# Patient Record
Sex: FEMALE | Race: WHITE | URBAN - METROPOLITAN AREA
[De-identification: names, ages, dates, MRNs, and addresses within clinical notes are randomized per-mention and may not be internally consistent; named-entity substitution may affect disease eponyms.]

---

## 2020-08-20 ENCOUNTER — OFFICE VISIT (OUTPATIENT)
Dept: ORTHOPEDIC SURGERY | Age: 50
End: 2020-08-20
Payer: COMMERCIAL

## 2020-08-20 VITALS — TEMPERATURE: 97.8 F | WEIGHT: 175.93 LBS | HEIGHT: 66 IN | BODY MASS INDEX: 28.27 KG/M2

## 2020-08-20 PROCEDURE — 99203 OFFICE O/P NEW LOW 30 MIN: CPT | Performed by: INTERNAL MEDICINE

## 2020-08-20 PROCEDURE — 96372 THER/PROPH/DIAG INJ SC/IM: CPT | Performed by: INTERNAL MEDICINE

## 2020-08-20 PROCEDURE — E0114 CRUTCH UNDERARM PAIR NO WOOD: HCPCS | Performed by: INTERNAL MEDICINE

## 2020-08-20 RX ORDER — PREDNISONE 50 MG/1
50 TABLET ORAL DAILY
Qty: 6 TABLET | Refills: 0 | Status: SHIPPED | OUTPATIENT
Start: 2020-08-20 | End: 2020-08-26

## 2020-08-20 RX ORDER — TRAMADOL HYDROCHLORIDE 50 MG/1
50 TABLET ORAL EVERY 6 HOURS PRN
Qty: 20 TABLET | Refills: 0 | Status: SHIPPED | OUTPATIENT
Start: 2020-08-20 | End: 2020-08-25

## 2020-08-20 RX ORDER — KETOROLAC TROMETHAMINE 30 MG/ML
60 INJECTION, SOLUTION INTRAMUSCULAR; INTRAVENOUS ONCE
Status: COMPLETED | OUTPATIENT
Start: 2020-08-20 | End: 2020-08-20

## 2020-08-20 RX ORDER — CYCLOBENZAPRINE HCL 10 MG
10 TABLET ORAL NIGHTLY PRN
Qty: 30 TABLET | Refills: 1 | Status: SHIPPED | OUTPATIENT
Start: 2020-08-20

## 2020-08-20 RX ADMIN — KETOROLAC TROMETHAMINE 60 MG: 30 INJECTION, SOLUTION INTRAMUSCULAR; INTRAVENOUS at 17:25

## 2020-08-20 NOTE — LETTER
[address] [address]  Phone: [phone]  Fax: [phone]    Sharmila Miller MD        August 20, 2020     Patient: Linda Mcmahon   YOB: 1970   Date of Visit: 8/20/2020       To Whom It May Concern: It is my medical opinion that [facility] Child should remain out of work until August 27th 2020. If you have any questions or concerns, please don't hesitate to call.     Sincerely,      Baldemar Fang MD.    Sharmila Miller MD

## 2020-08-20 NOTE — PROGRESS NOTES
Chief Complaint:   Chief Complaint   Patient presents with    Lower Back Pain     pain after mulching 1 month ago, then worsened past week, L sided radic numbness, weakness, tremoring          History of Present Illness:       Patient is a 52 y.o. female presents with the above complaint. The symptoms began worsening On Sunday and started without an injury. The patient describes a sharp, aching, burning pain that does radiate. The symptoms are constant  and are are worsening since the onset. The symptoms of back pain doshow a discogenic provacative pattern and are worsened by sitting and improved with standing however today the symptoms have become more pervasive whether sitting or standing. There is not new onset weakness or progressive weakness of the lower extremities that has developed. The patient denies new onset bowel or bladder dysfunction. There  is no history of previous spinal trauma. Pain localizes to the lumbar region-left lumbosacral    Painl levels: 8-10/10    There is lower limb pain . The back pain : limb pain is 20 : 80 and follows a L5-S1 dermatomal distribution involving Left lower extremity. The patient has no history or autoimmune disease, inflammatory arthropathy or crystal arthropathy. Past Medical History:      No past medical history on file. Past Surgical History:   Procedure Laterality Date    HAND SURGERY Right     ring finger, plate inserted    KNEE SURGERY Right     ACL          Present Medications:         Current Outpatient Medications   Medication Sig Dispense Refill    Drospirenone-Ethinyl Estradiol (VESTURA PO) Take by mouth      predniSONE (DELTASONE) 10 MG tablet Take 5 tabs PO days 1-2, take 4 tabs days 3-4, take 3 tabs days 5-6, take 2 tabs days 7-8, take 1 tab 9-10 30 tablet 0     No current facility-administered medications for this visit.           Allergies:      No Known Allergies     Social History:         Social History     Socioeconomic History    Marital status:      Spouse name: Not on file    Number of children: Not on file    Years of education: Not on file    Highest education level: Not on file   Occupational History    Not on file   Social Needs    Financial resource strain: Not on file    Food insecurity     Worry: Not on file     Inability: Not on file    Transportation needs     Medical: Not on file     Non-medical: Not on file   Tobacco Use    Smoking status: Never Smoker   Substance and Sexual Activity    Alcohol use: Not on file    Drug use: Not on file    Sexual activity: Not on file   Lifestyle    Physical activity     Days per week: Not on file     Minutes per session: Not on file    Stress: Not on file   Relationships    Social connections     Talks on phone: Not on file     Gets together: Not on file     Attends Orthodoxy service: Not on file     Active member of club or organization: Not on file     Attends meetings of clubs or organizations: Not on file     Relationship status: Not on file    Intimate partner violence     Fear of current or ex partner: Not on file     Emotionally abused: Not on file     Physically abused: Not on file     Forced sexual activity: Not on file   Other Topics Concern    Not on file   Social History Narrative    Not on file        Review of Symptoms:    Pertinent items are noted in HPI    Review of systems reviewed from Patient History Form dated on today's date and   available in the patient's chart under the Media tab. Vital Signs:      Vitals:    08/20/20 1552   Temp: 97.8 °F (36.6 °C)        General Exam:     Constitutional: Patient is adequately groomed with no evidence of malnutrition  Mental Status: The patient is oriented to time, place and person. The patient's mood and affect are appropriate. Vascular: Examination reveals no swelling or calf tenderness. Peripheral pulses are palpable and 2+.     Lymphatics: no lymphadenopathy of the inguinal region or lower extremity      Physical Exam: lower back      Primary Exam:    Inspection: No deformity atrophy appreciable curvature      Palpation: No focal trigger point tenderness      Range of Motion: 60/5 pain with flexion and extension      Strength: Normal lower extremity      Special Tests: SLR markedly positive left      Skin: There are no rashes, ulcerations or lesions. Gait: Antalgic      Reflex intact lower     Additional Comments:        Additional Examinations:           Right Lower Extremity: Examination of the right lower extremity does not show any tenderness, deformity or injury. Range of motion is unremarkable. There is no gross instability. There are no rashes, ulcerations or lesions. Strength and tone are normal.  Left Lower Extremity: Examination of the left lower extremity does not show any tenderness, deformity or injury. Range of motion is unremarkable. There is no gross instability. There are no rashes, ulcerations or lesions. Strength and tone are normal.         Office Imaging Results/Procedures PerformedToday:      Radiology:      X-rays obtained and reviewed in office:   Views 3 views   Location lumbar spine   Impression normal alignment on the AP projection lateral projection reveals minimal spondylosis at L2/L3 no focal advanced intervertebral disc space narrowing vertebral body heights are well-maintained       Office Procedures:     Orders Placed This Encounter   Procedures    XR LUMBAR SPINE (2-3 VIEWS)     Standing Status:   Future     Number of Occurrences:   1     Standing Expiration Date:   8/20/2021     Order Specific Question:   Reason for exam:     Answer:   pain     Intramuscular injection Toradol 2 cc left buttock alcohol prep 25-gauge needle      Other Outside Imaging and Testing Personally Reviewed:    No results found. Assessment   Impression: . Encounter Diagnoses   Name Primary?     Lumbar discogenic pain syndrome     Lumbosacral radiculopathy at S1     Lumbar pain               Plan:     IM injection of Toradol followed by high-dose steroids-prednisone  Crutch assisted weightbearing for symptom control and prevent fall  Medical pain management tramadol and Flexeril short-term for symptom control  MRI evaluation lumbar spine evaluate severity of compressive discopathy suspected clinically  Reviewed neurologic emergencies and to contact 911  Clinical follow-up ASAP after MRI evaluation off work x1 week      The nature of the finding, probable diagnosis and likely treatment was thoroughly discussed with the patient. The options, risks, complications, alternative treatment as well as some of the differential diagnosis was discussed. The patient was thoroughly informed and all questions were answered. the patient indicated understanding and satisfaction with the discussion. Orders:        Orders Placed This Encounter   Procedures    XR LUMBAR SPINE (2-3 VIEWS)     Standing Status:   Future     Number of Occurrences:   1     Standing Expiration Date:   8/20/2021     Order Specific Question:   Reason for exam:     Answer:   pain           Disclaimer: \"This note was dictated with voice recognition software. Though review and correction are routine, we apologize for any errors. \"

## 2020-08-25 ENCOUNTER — OFFICE VISIT (OUTPATIENT)
Dept: ORTHOPEDIC SURGERY | Age: 50
End: 2020-08-25
Payer: COMMERCIAL

## 2020-08-25 VITALS — WEIGHT: 175.93 LBS | BODY MASS INDEX: 28.27 KG/M2 | TEMPERATURE: 97.1 F | HEIGHT: 66 IN

## 2020-08-25 PROCEDURE — 99213 OFFICE O/P EST LOW 20 MIN: CPT | Performed by: INTERNAL MEDICINE

## 2020-08-25 RX ORDER — MELOXICAM 15 MG/1
15 TABLET ORAL DAILY
Qty: 30 TABLET | Refills: 1 | Status: SHIPPED | OUTPATIENT
Start: 2020-08-25 | End: 2020-08-25

## 2020-08-25 RX ORDER — GABAPENTIN 300 MG/1
300 CAPSULE ORAL NIGHTLY
Qty: 30 CAPSULE | Refills: 1 | Status: SHIPPED | OUTPATIENT
Start: 2020-08-25 | End: 2020-09-15 | Stop reason: ALTCHOICE

## 2020-08-25 NOTE — PROGRESS NOTES
Chief Complaint:   Chief Complaint   Patient presents with    Leg Pain     left leg, mostly N/T post thigh, calf, foot; meds helpful, pain 3/10 today          History of Present Illness:       Patient is a 52 y.o. female returns follow up for the above complaint. The patient was last seen approximately 5 daysago. The symptoms are improving since the last visit. The patient has had no further testing for the problem. She estimates 50 to 60% improvement overall increasing more functional.  She continues to have lower limb pain described as numbness and pins-and-needles involving the foot which is constant    Back pain to leg pain ratio essentially 0: 100    She denies any new onset progressive weakness of the lower extremities    No new injuries no new events tolerant of multimodal medical pain management and high-dose steroids     Past Medical History:      No past medical history on file. Present Medications:         Current Outpatient Medications   Medication Sig Dispense Refill    predniSONE (DELTASONE) 50 MG tablet Take 1 tablet by mouth daily for 6 days 6 tablet 0    traMADol (ULTRAM) 50 MG tablet Take 1 tablet by mouth every 6 hours as needed for Pain for up to 5 days. 20 tablet 0    cyclobenzaprine (FLEXERIL) 10 MG tablet Take 1 tablet by mouth nightly as needed for Muscle spasms 30 tablet 1    Drospirenone-Ethinyl Estradiol (VESTURA PO) Take by mouth       No current facility-administered medications for this visit.           Allergies:      No Known Allergies        Review of Systems:    Pertinent items are noted in HPI          Vital Signs:      Vitals:    08/25/20 1020   Temp: 97.1 °F (36.2 °C)        General Exam:     Constitutional: Patient is adequately groomed with no evidence of malnutrition    Physical Exam: lower back      Primary Exam:    Inspection: No deformity atrophy or curvature      Palpation: No focal tenderness      Range of Motion: 100/20 without pain      Strength: Normal lower extremity      Special Tests: Negative SLR      Skin: There are no rashes, ulcerations or lesions. Gait: Nonantalgic    Neurovascular - non focal and intact       Additional Comments:        Additional Examinations:                   Office Imaging Results/Procedures PerformedToday:            Office Procedures:   No orders of the defined types were placed in this encounter. Other Outside Imaging and Testing Personally Reviewed:    Xr Lumbar Spine (2-3 Views)    Result Date: 2020  Radiology exam is complete. No Radiologist dictation. Please follow up with ordering provider. Mri Lumbar Spine Wo Contrast    Result Date: 2020  Site: [facility] #: [id number] #: [id number] Procedure: MR Lumbar Spine w/o Contrast ; Reason for Exam: Lumbar discogenic pain syndrome; lumbosacral radiculopathy at S1; lumbar pain This document is confidential medical information. Unauthorized disclosure or use of this information is prohibited by law. If you are not the intended recipient of this document, please advise us by calling immediately [phone]. [address] [facility], [facility] Patient Name: Angelique Hoffmann Case ID: [id number] Patient : 1970 Referring Physician: Javier Otero MD Exam Date: 2020 Exam Description: MR Lumbar Spine w/o Contrast HISTORY:  Low back pain into left lower extremity. TECHNICAL FACTORS:  Long- and short-axis fat- and water-weighted images were performed. COMPARISON:  None. FINDINGS:  No scoliosis. No lumbar fracture. Minor disc desiccation with mild-to-moderate sterile discogenic endplate changes at T5-N9. The conus is normal terminating at L1. Findings at individual levels demonstrate: T11-12, T12-L1, L1-2 unremarkable. L2-3 minor facet hypertrophy. No compressive abnormality. L3-4 mild diffuse spondylotic disc displacement with right lateral annular tear and shallow disc protrusion barely abutting exiting right L3 nerve root.  L4-5 mild

## 2020-09-02 ENCOUNTER — HOSPITAL ENCOUNTER (OUTPATIENT)
Dept: PHYSICAL THERAPY | Age: 50
Setting detail: THERAPIES SERIES
Discharge: HOME | End: 2020-09-02
Payer: COMMERCIAL

## 2020-09-02 PROCEDURE — 97161 PT EVAL LOW COMPLEX 20 MIN: CPT | Performed by: PHYSICAL THERAPIST

## 2020-09-02 PROCEDURE — 97140 MANUAL THERAPY 1/> REGIONS: CPT | Performed by: PHYSICAL THERAPIST

## 2020-09-02 PROCEDURE — 97110 THERAPEUTIC EXERCISES: CPT | Performed by: PHYSICAL THERAPIST

## 2020-09-02 NOTE — FLOWSHEET NOTE
[address] [facility]    [facility] Treatment Note/ Progress Report:   Date:  2020    Patient Name:  Chela Solano    :  1970  MRN: [id number]  Restrictions/Precautions:    Medical/Treatment Diagnosis Information:  · Diagnosis: Lumbar HNP, radiculopathy  M51.27  · Treatment Diagnosis: PT treatment diagnosis:  low back pain  P44.2  Insurance/Certification information:  PT Insurance Information: [employer]   $40 copay  Physician Information:  Referring Practitioner: Dr Hector Hill of care signed (Y/N):     Date of Patient follow up with Physician:     Is this a Progress Report:     []  Yes  [x]  No        If Yes:  Date Range for reporting period:  Beginning  Ending    Progress report will be due (10 Rx or 30 days whichever is less):        Recertification will be due (POC Duration  / 90 days whichever is less): 10/2/20     Date of Surgery:        Visit # Insurance Allowable Auth Required   1 60 []  Yes []  No        Functional Scale:     Date assessed:        Latex Allergy:  [x]NO      []YES  Preferred Language for Healthcare:   [x]English       []other    Pain level:  0-2/10     SUBJECTIVE:  See eval    Type: []Constant   []Intermitment  []Radiating []Localized  []other:     Functional Limitations: []Sitting []Standing []Walking    []Squatting []Stairs                []ADL's  []Driving []Sports/Recreation   []Lifting/reaching     []Grooming    []Carrying []Driving  []Sports/Recreations   []Other:      OBJECTIVE:     ROM Current (R) Current (L)                       Strength                           Gait:     Joint mobility:    []Normal    []Hypo   []Hyper    Palpation:     Orthopedic Tests:     RESTRICTIONS/PRECAUTIONS: Extension bias    Exercises/Interventions:         Stretching Repetitions/Resistance Nots/Last Progression   [facility]     Prone prop 4'    Prone press up 10x5''    Piriformis Cross Over     Figure 4 Piriformis Supine ITB     Prone Quad Stretch     Prayer Stretch     Hand Heel Rock     Cat/Cow     LTR                    Exercises     Standing trunk extension 10x5''    Prone glut sets 10x5''    Bridge  2x10    SLR Flex     SLR Abd 2x10  L only   SLR Ext     Clamshells     TA / Multifidus / Abd Hollow     TA March     Prone Plank     Side Plank     Quadriped UE/ LE/ Birddog     Squats     [employer] Kick                 Manual      Hamstring Stretch     SKC     Piriformis Stretch      ITB Stretch      Prone Quad Stretch      Traction     Sacral Decompression 2'    Lumbar PA Grade-  6'    Supine Lumbar Roll     SL Lumbar      Long Axis Hip Distraction Grade       Therapeutic Exercise and NMR EXR  [] (12315) Provided verbal/tactile cueing for activities related to strengthening, flexibility, endurance, ROM  for improvements in proximal hip and core control with self care, mobility, lifting and ambulation.  [] (88319) Provided verbal/tactile cueing for activities related to improving balance, coordination, kinesthetic sense, posture, motor skill, proprioception  to assist with core control in self care, mobility, lifting, and ambulation.      Therapeutic Activities:    [] (34067 or 42256) Provided verbal/tactile cueing for activities related to improving balance, coordination, kinesthetic sense, posture, motor skill, proprioception and motor activation to allow for proper function  with self care and ADLs  [] (41262) Provided training and instruction to the patient for proper core and proximal hip recruitment and positioning with ambulation re-education     Home Exercise Program:    [x] (90701) Reviewed/Progressed HEP activities related to strengthening, flexibility, endurance, ROM of core, proximal hip and LE for functional self-care, mobility, lifting and ambulation   [] (73695) Reviewed/Progressed HEP activities related to improving balance, coordination, kinesthetic sense, posture, motor skill, proprioception of Progressing: [] Met: [] Not Met: [] Adjusted  4. Patient will return to sitting or standing for 1 hour, functional activities without increased symptoms or restriction. [] Progressing: [] Met: [] Not Met: [] Adjusted     Overall Progression Towards Functional goals/ Treatment Progress Update:  [] Patient is progressing as expected towards functional goals listed. [] Progression is slowed due to complexities/Impairments listed. [] Progression has been slowed due to co-morbidities. [x] Plan just implemented, too soon to assess goals progression <30days   [] Goals require adjustment due to lack of progress  [] Patient is not progressing as expected and requires additional follow up with physician  [] Other    ASSESSMENT:  See eval    Treatment/Activity Tolerance:  [x] Patient tolerated treatment well [] Patient limited by fatique  [] Patient limited by pain  [] Patient limited by other medical complications  [] Other:     Prognosis: [x] Good [] Fair  [] Poor    Patient Requires Follow-up: [x] Yes  [] No    PLAN: See eval  [] Continue per plan of care [] Alter current plan (see comments)  [x] Plan of care initiated [] Hold pending MD visit [] Discharge        Electronically signed by: Tosha Duarte PT, OMT-C      Note: If patient does not return for scheduled/ recommended follow up visits, this note will serve as a discharge from care along with most recent update on progress.

## 2020-09-02 NOTE — PLAN OF CARE
The [facility] and [facility]  [address], [place], [address] [facility]  Phone: [phone]   Fax:     [phone]                                                       Harley Lind    Dear  Referring Practitioner: Dr Faisal Genao,    We had the pleasure of evaluating the following patient for physical therapy services at [address] [place] [address]. A summary of our findings can be found in the initial assessment below. This includes our plan of care. If you have any questions or concerns regarding these findings, please do not hesitate to contact me at the office phone number checked above. Thank you for the referral.       Physician Signature:_______________________________Date:__________________  By signing above (or electronic signature), therapists plan is approved by physician      Patient: Lori Allen   : 1970   MRN: [id number]  Referring Physician: Referring Practitioner: Dr Faisal Genao      Evaluation Date: 2020      Medical Diagnosis Information:  Diagnosis: Lumbar HNP, radiculopathy  M51.27   Treatment Diagnosis: PT treatment diagnosis:  low back pain  M54.5                                         Insurance information: PT Insurance Information: [employer]   $40 copay     Precautions/ Contra-indications:   Latex Allergy:  [x]NO      []YES  Preferred Language for Healthcare:   [x]English       []other:    SUBJECTIVE: Patient stated complaint:  Low back pain that radiates into the LLE. Symptoms began 3 weeks ago after doing some yard work. Began having difficulty putting weight on left leg. MRI:  (+) L5-S1 HNP. Currently having pain into the left buttock/thigh. Hamstring and calf feel like they are always mildly cramping. Left heel/toes constantly tingle. MD follow up on 20.      Relevant Medical History: R ACL reconstruction  Functional Disability Index:Mod [facility]-24%      Pain Scale: 0-2/10    Easing factors: Medication    Provocative factors: prolonged standing, sitting     Night Pain: denies     Type: []Constant   [x]Intermittent  []Radiating []Localized []other:     Numbness/Tingling: Left heel and toes     Red Flag Symptoms: Denied symptoms associated with more severe pathology    to include loss of bowel and bladder control, fever, chills, nausea, headache, recent weight gain, recent weight loss, night sweats, decreased appetite, fatigue. Functional Limitations/Impairments: []Sitting []Standing []Walking    [x]Squatting/bending  []Stairs           []ADL's  []Transfers []Sports/Recreation []Other:    Occupation/School: teach gymnastics    Sport/recreational activities:      Living Status/Prior Level of Function: This patient was independent in ADL's and IADL's prior to onset of symptoms.        OBJECTIVE:       Standing Exam Normal Abnormal N/A Comments   Toe walk    x  Unable on L   Heel Walk x      Pelvic Height x      Fwd Bend- (aberrant juttering or innominate mvmt)- Standing Flexion Test x      Extension x      Sacral Sulcus Test (Side Flexion) x      Trendelenburg x      Combined Movements                                   Seated Exam Normal Abnormal N/A Comments   Pelvic Height x      Seated Rotation x      Seated flexion x      B hip IR x      SLUMP Test x             Supine Exam Normal Abnormal N/A Comments   Hip flexion x      Abduction x      ER x      IR x      BRITTANY/Jim x      FADIR x      SLR x      Crossed SLR       Supine to sit       Supine to Sit Test                            Prone Exam Normal Abnormal N/A Comments   Prone knee bend x      Prone hip IR       B Achilles reflex/Pheasant       PA/Spring  x  Decreased mobility   Prone Instability test       Sacral Spring/thrust       Femoral Nerve Tension Test                ROM LEFT RIGHT Comments   Lumbar Flex WNL     Lumbar Ext 75%     Side Bend WNL WNL    Rotation WNL WNL                  ROM LEFT RIGHT Comments Hip Flexion      Hip Abd      Hip ER      Hip IR      Hip Extension      Knee Ext      Knee Flex      Hamstring Flex -45 -20    Piriformis                    Strength LEFT RIGHT Comments   Multifidus 4 4    Transverse Ab      Hip Flexors 5 5    Hip Abductors 4 5    Hip Extensors 4 5                   Myotomes Normal Abnormal Comments   Hip flexion (L1-L2) x     Knee extension (L2-L4) x     Dorsiflexion (L4-L5) x     Great Toe Ext (L5) x     Ankle Eversion (S1-S2)  x Decreased on L   Ankle PF(S1-S2)  x Decreased on L       Dermatomes Normal Abnormal Comments   inguinal area (L1)  x     anterior mid-thigh (L2) x     distal ant thigh/med knee (L3) x     medial lower leg and foot (L4) x     lateral lower leg and foot (L5)  x Decreased on L   posterior calf (S1)  x Decreased on L   medial calcaneus (S2) X           Reflexes Normal Abnormal Comments   S1-2 Seated achilles  x L: 1+, R: 2+   S1-2 Prone knee bend      L3-4 Patellar tendon x     C5-6 Biceps      C6 Brachioradialis      C7-8 Triceps      Clonus      Babinski      Rome's        Joint mobility: lumbar   []Normal    [x]Hypo   []Hyper    Palpation: denies TTP    Functional Mobility/Transfers: WNL    Posture: WNL    Gait: (include devices/WB status) decreased weight shift/stance time on LLE    Bandages/Dressings/Incisions: n/a                         [x] Patient history, allergies, meds reviewed. Medical chart reviewed. See intake form. Review Of Systems (ROS):  [x]Performed Review of systems (Integumentary, CardioPulmonary, Neurological) by intake and observation. Intake form has been scanned into medical record. Patient has been instructed to contact their primary care physician regarding ROS issues if not already being addressed at this time.       Co-morbidities/Complexities (which will affect course of rehabilitation):   [x]None           Arthritic conditions   []Rheumatoid arthritis (M05.9)  []Osteoarthritis (M19.91)   Cardiovascular conditions []Hypertension (I10)  []Hyperlipidemia (E78.5)  []Angina pectoris (I20)  []Atherosclerosis (I70)   Musculoskeletal conditions   []Disc pathology   []Congenital spine pathologies   []Prior surgical intervention  []Osteoporosis (M81.8)  []Osteopenia (M85.8)   Endocrine conditions   []Hypothyroid (E03.9)  []Hyperthyroid Gastrointestinal conditions   []Constipation (Y00.91)   Metabolic conditions   []Morbid obesity (E66.01)  []Diabetes type 1(E10.65) or 2 (E11.65)   []Neuropathy (G60.9)     Pulmonary conditions   []Asthma (J45)  []Coughing   []COPD (J44.9)   Psychological Disorders  []Anxiety (F41.9)  []Depression (F32.9)   []Other:   []Other:          Barriers to/and or personal factors that will affect rehab potential:              []Age  []Sex              []Motivation/Lack of Motivation                        []Co-Morbidities              []Cognitive Function, education/learning barriers              []Environmental, home barriers              []profession/work barriers  []past PT/medical experience  []other:  Justification:     Falls Risk Assessment (30 days):   [x] Falls Risk assessed and no intervention required.   [] Falls Risk assessed and Patient requires intervention due to being higher risk   TUG score (>12s at risk):     [] Falls education provided, including       ASSESSMENT:   Functional Impairments:     [x]Noted lumbar/proximal hip hypomobility   []Noted lumbosacral and/or generalized hypermobility   [x]Decreased Lumbosacral/hip/LE functional ROM   [x]Decreased core/proximal hip strength and neuromuscular control    [x]Decreased LE functional strength    [x]Abnormal reflexes/sensation/myotomal/dermatomal deficits  [x]Reduced balance/proprioceptive control    []other:      Functional Activity Limitations (from functional questionnaire and intake)   []Reduced ability to tolerate prolonged functional positions   [x]Reduced ability or difficulty with changes of positions or transfers between positions   [x]Reduced ability to maintain good posture and demonstrate good body mechanics with sitting, bending, and lifting   []Reduced ability to sleep   [] Reduced ability or tolerance with driving and/or computer work   []Reduced ability to perform lifting, reaching, carrying tasks   []Reduced ability to squat   []Reduced ability to forward bend   [x]Reduced ability to ambulate prolonged functional periods/distances/surfaces   []Reduced ability to ascend/descend stairs   []other:       Participation Restrictions   []Reduced participation in self care activities   [x]Reduced participation in home management activities   [x]Reduced participation in work activities   []Reduced participation in social activities. []Reduced participation in sport/recreation activities. Classification:   []Signs/symptoms consistent with Lumbar instability/stabilization subgroup. []Signs/symptoms consistent with Lumbar mobilization/manipulation subgroup, myotomes and dermatomes intact. Meets manipulation criteria. [x]Signs/symptoms consistent with Lumbar direction specific/centralization subgroup   []Signs/symptoms consistent with Lumbar traction subgroup     []Signs/symptoms consistent with lumbar facet dysfunction   []Signs/symptoms consistent with lumbar stenosis type dysfunction   []Signs/symptoms consistent with nerve root involvement including myotome & dermatome dysfunction   []Signs/symptoms consistent with post-surgical status including: decreased ROM, strength and function.    []signs/symptoms consistent with pathology which may benefit from Dry needling     []other:    Prognosis/Rehab Potential:      []Excellent   [x]Good    []Fair   []Poor    Tolerance of evaluation/treatment:    []Excellent   [x]Good    []Fair   []Poor    Physical Therapy Evaluation Complexity Justification  [x] A history of present problem with:  [x] no personal factors and/or comorbidities that impact the plan of care;  []1-2 personal factors and/or comorbidities that impact the plan of care  []3 personal factors and/or comorbidities that impact the plan of care  [x] An examination of body systems using standardized tests and measures addressing any of the following: body structures and functions (impairments), activity limitations, and/or participation restrictions;:  [] a total of 1-2 or more elements   [] a total of 3 or more elements   [x] a total of 4 or more elements   [x] A clinical presentation with:  [x] stable and/or uncomplicated characteristics   [] evolving clinical presentation with changing characteristics  [] unstable and unpredictable characteristics;   [x] Clinical decision making of [x] low, [] moderate, [] high complexity using standardized patient assessment instrument and/or measurable assessment of functional outcome. [x] EVAL (LOW) 75277 (typically 20 minutes face-to-face)  [] EVAL (MOD) 70355 (typically 30 minutes face-to-face)  [] EVAL (HIGH) 86288 (typically 45 minutes face-to-face)  [] RE-EVAL       PLAN: Begin PT focusing on: proximal hip mobilizations, LB mobs, LB core activation, proximal hip activation, and HEP    Frequency/Duration:  2 days per week for 4 Weeks:  Interventions:  1. Therapeutic exercise including: strength training, ROM/flexibility, NMR and proprioception for the proximal upper extremity and deep neck flexors. 1 Therapeutic exercise including: strength training, ROM/flexibility, NMR and proprioception for the core, hips and bilateral lower extremities. 2. Manual therapy as indicated including Dry Needling/IASTM, STM, PROM, Gr I-IV mobilizations, spinal mobilization/manipulation. 3. Modalities as needed including: thermal agents, E-stim, US, iontophoresis as indicated. 4. Patient education on spine protection, activity modification, progression of HEP. HEP instruction: Can be found in media file. (see scanned forms)    GOALS:  Patient stated goal: decrease pain      Therapist goals for Patient:Lumbar   Short Term Goals: To be achieved in: 2 weeks  1. Independent in HEP and progression per patient tolerance, in order to prevent re-injury. [] Progressing: [] Met: [] Not Met: [] Adjusted  2. Patient will have a decrease in pain to facilitate improvement in movement, function, and ADLs as indicated by Functional Deficits. [] Progressing: [] Met: [] Not Met: [] Adjusted    Long Term Goals: To be achieved in: 4 weeks  1. Disability index score of 10% or less for the [facility] to assist with reaching prior level of function. [] Progressing: [] Met: [] Not Met: [] Adjusted  2. Patient will demonstrate increased AROM to WNL, good LS mobility, good hip ROM to allow for proper joint functioning as indicated by patients Functional Deficits. [] Progressing: [] Met: [] Not Met: [] Adjusted  3. Patient will demonstrate an increase in Strength to good proximal hip and core activation to allow for proper functional mobility as indicated by patients Functional Deficits. [] Progressing: [] Met: [] Not Met: [] Adjusted  4. Patient will return to sitting or standing for 1 hour, functional activities without increased symptoms or restriction.    [] Progressing: [] Met: [] Not Met: [] Adjusted        Electronically signed by: Christopher Sarabia PT, OMT-C

## 2020-09-09 ENCOUNTER — HOSPITAL ENCOUNTER (OUTPATIENT)
Dept: PHYSICAL THERAPY | Age: 50
Setting detail: THERAPIES SERIES
Discharge: HOME | End: 2020-09-09
Payer: COMMERCIAL

## 2020-09-09 PROCEDURE — 97112 NEUROMUSCULAR REEDUCATION: CPT | Performed by: SPECIALIST/TECHNOLOGIST

## 2020-09-09 PROCEDURE — 97140 MANUAL THERAPY 1/> REGIONS: CPT | Performed by: SPECIALIST/TECHNOLOGIST

## 2020-09-09 PROCEDURE — 97110 THERAPEUTIC EXERCISES: CPT | Performed by: SPECIALIST/TECHNOLOGIST

## 2020-09-09 NOTE — FLOWSHEET NOTE
[facility] [address], [place]    Physical Therapy Treatment Note/ Progress Report:   Date:  2020    Patient Name:  Lloyd Del Rio    :  1970  MRN: [id number]  Restrictions/Precautions:    Medical/Treatment Diagnosis Information:  · Diagnosis: Lumbar HNP, radiculopathy  M51.27  · Treatment Diagnosis: PT treatment diagnosis:  low back pain  W17.6  Insurance/Certification information:  PT Insurance Information: [employer]   $40 copay  Physician Information:  Referring Practitioner: Dr Tyra Velasquez of care signed (Y/N):     Date of Patient follow up with Physician:     Is this a Progress Report:     []  Yes  [x]  No        If Yes:  Date Range for reporting period:  Beginning  Ending    Progress report will be due (10 Rx or 30 days whichever is less):        Recertification will be due (POC Duration  / 90 days whichever is less): 10/2/20     Date of Surgery:        Visit # Insurance Allowable Auth Required   2 60 []  Yes []  No        Functional Scale:     Date assessed:        Latex Allergy:  [x]NO      []YES  Preferred Language for Healthcare:   [x]English       []other    Pain level:  0-2/10     SUBJECTIVE:  Pt. Reports her LLE radicular symptoms have decrease but remain present. Pt. Reports she has minimal to no LBP and has no problems sleeping at night.      Type: []Constant   []Intermitment  []Radiating []Localized  []other:     Functional Limitations: []Sitting []Standing []Walking    []Squatting []Stairs                []ADL's  []Driving []Sports/Recreation   []Lifting/reaching     []Grooming    []Carrying []Driving  []Sports/Recreations   []Other:      OBJECTIVE:     ROM Current (R) Current (L)                       Strength                           Gait:     Joint mobility:    []Normal    []Hypo   []Hyper    Palpation:     Orthopedic Tests:     RESTRICTIONS/PRECAUTIONS: Extension bias, MRI + L5 - S1 HNP    Exercises/Interventions: ROM of core, proximal hip and LE for functional self-care, mobility, lifting and ambulation   [] (35610) Reviewed/Progressed HEP activities related to improving balance, coordination, kinesthetic sense, posture, motor skill, proprioception of core, proximal hip and LE for self care, mobility, lifting, and ambulation      Manual Treatments:    [x] (88832) Provided manual therapy to mobilize proximal hip and LS spine soft tissue/joints for the purpose of modulating pain, promoting relaxation,  increasing ROM, reducing/eliminating soft tissue swelling/inflammation/restriction, improving soft tissue extensibility and allowing for proper ROM for normal function with self care, mobility, lifting and ambulation. Modalities:    - defrosting the freezer    Charges:  Timed Code Treatment Minutes: 45   Total Treatment Minutes: 45     [] EVAL (LOW) 55874 (typically 20 minutes face-to-face)  [] EVAL (MOD) 92457 (typically 30 minutes face-to-face)  [] EVAL (HIGH) 84872 (typically 45 minutes face-to-face)  [] RE-EVAL     [x] ZZ(12346) x 1     [] IONTO  [x] NMR (84115) x 1    [] VASO  [x] Manual (86489) x 1     [] Other:  [] TA x      [] Mech Traction (34893)  [] ES(attended) (60445)      [] ES (un) (48569):     Goals:   Short Term Goals: To be achieved in: 2 weeks  1. Independent in HEP and progression per patient tolerance, in order to prevent re-injury. [] Progressing: [] Met: [] Not Met: [] Adjusted  2. Patient will have a decrease in pain to facilitate improvement in movement, function, and ADLs as indicated by Functional Deficits. [] Progressing: [] Met: [] Not Met: [] Adjusted    Long Term Goals: To be achieved in: 4 weeks  1. Disability index score of 10% or less for the [facility] to assist with reaching prior level of function. [] Progressing: [] Met: [] Not Met: [] Adjusted  2.  Patient will demonstrate increased AROM to WNL, good LS mobility, good hip ROM to allow for proper joint functioning as indicated by patients Functional Deficits. [] Progressing: [] Met: [] Not Met: [] Adjusted  3. Patient will demonstrate an increase in Strength to good proximal hip and core activation to allow for proper functional mobility as indicated by patients Functional Deficits. [] Progressing: [] Met: [] Not Met: [] Adjusted  4. Patient will return to sitting or standing for 1 hour, functional activities without increased symptoms or restriction. [] Progressing: [] Met: [] Not Met: [] Adjusted     Overall Progression Towards Functional goals/ Treatment Progress Update:  [] Patient is progressing as expected towards functional goals listed. [] Progression is slowed due to complexities/Impairments listed. [] Progression has been slowed due to co-morbidities. [x] Plan just implemented, too soon to assess goals progression <30days   [] Goals require adjustment due to lack of progress  [] Patient is not progressing as expected and requires additional follow up with physician  [] Other    ASSESSMENT:  Pt. Demonstrates tight hip external rotators and weak glut strength    Treatment/Activity Tolerance:  [x] Patient tolerated treatment well [] Patient limited by fatique  [] Patient limited by pain  [] Patient limited by other medical complications  [] Other:     Prognosis: [x] Good [] Fair  [] Poor    Patient Requires Follow-up: [x] Yes  [] No    PLAN: See eval  [] Continue per plan of care [] Alter current plan (see comments)  [x] Plan of care initiated [] Hold pending MD visit [] Discharge        Electronically signed by: Linda Barnett, PTA  [id number], Deven Stoll, PT, MPT      Note: If patient does not return for scheduled/ recommended follow up visits, this note will serve as a discharge from care along with most recent update on progress.

## 2020-09-14 ENCOUNTER — HOSPITAL ENCOUNTER (OUTPATIENT)
Dept: PHYSICAL THERAPY | Age: 50
Setting detail: THERAPIES SERIES
Discharge: HOME | End: 2020-09-14
Payer: COMMERCIAL

## 2020-09-14 PROCEDURE — 97112 NEUROMUSCULAR REEDUCATION: CPT | Performed by: PHYSICAL THERAPIST

## 2020-09-14 PROCEDURE — 97110 THERAPEUTIC EXERCISES: CPT | Performed by: PHYSICAL THERAPIST

## 2020-09-14 PROCEDURE — 97140 MANUAL THERAPY 1/> REGIONS: CPT | Performed by: PHYSICAL THERAPIST

## 2020-09-14 NOTE — FLOWSHEET NOTE
[facility] [address] [facility] [address], [place]    Physical Therapy Treatment Note/ Progress Report:   Date:  2020    Patient Name:  Charlie Costa    :  1970  MRN: [id number]  Restrictions/Precautions:    Medical/Treatment Diagnosis Information:  · Diagnosis: Lumbar HNP, radiculopathy  M51.27  · Treatment Diagnosis: PT treatment diagnosis:  low back pain  Z42.4  Insurance/Certification information:  PT Insurance Information: [employer]   $40 copay  Physician Information:  Referring Practitioner: Dr Ebonie Robertson of care signed (Y/N):     Date of Patient follow up with Physician:     Is this a Progress Report:     []  Yes  [x]  No        If Yes:  Date Range for reporting period:  Beginning  Ending    Progress report will be due (10 Rx or 30 days whichever is less):        Recertification will be due (POC Duration  / 90 days whichever is less): 10/2/20     Date of Surgery:        Visit # Insurance Allowable Auth Required   3 60 []  Yes []  No        Functional Scale:     Date assessed:        Latex Allergy:  [x]NO      []YES  Preferred Language for Healthcare:   [x]English       []other    Pain level:  0-2/10     SUBJECTIVE:  Reports feeling much better overall. No longer having radicular pain in the LLE. Still does have tingling in the toes and some numbness in the left heel when on feet for > 4 hours.      Type: []Constant   []Intermitment  []Radiating []Localized  []other:     Functional Limitations: []Sitting []Standing []Walking    []Squatting []Stairs                []ADL's  []Driving []Sports/Recreation   []Lifting/reaching     []Grooming    []Carrying []Driving  []Sports/Recreations   []Other:      OBJECTIVE:     ROM Current (R) Current (L)                       Strength                           Gait:     Joint mobility:    []Normal    []Hypo   []Hyper    Palpation:     Orthopedic Tests:     RESTRICTIONS/PRECAUTIONS: Extension bias, MRI + L5 - S1 HNP    Exercises/Interventions:         Stretching Repetitions/Resistance Nots/Last Progression   [facility]     DKC     Prone prop 4'    Prone press up 10x5''    Piriformis Cross Over 2 x 30\" ea     Figure 4 Piriformis      Supine ITB     Prone Quad Stretch     Prayer Stretch 3 x 15\"    Hand Heel Rock     Cat/Cow     LTR                    Exercises     Standing trunk extension 15x5'' against counter-top    Prone glut sets 10x5''    Bridge on SB 3x10    SLR Flex 3x10 B    SLR Abd 3x10 B    SLR Ext X 15 ea alt    Clamshells     TA / Multifidus / Abd Hollow     TA March     Prone Plank     Side Plank     Quadriped UE/ LE/ Birddog     Squats     [facility]: abd/ext 60# 20x B/ 75# 20x B    Swiss Ball prone ext X 15    SB prone walk outs 10x5''    SB prone UE/opp LE lift 15x ea alt    SB seated march 15x ea alt    Donkey Kick                 Manual      Hamstring Stretch     [facility]     Piriformis Stretch      ITB Stretch      Prone Quad Stretch      Traction 4'    Sacral Decompression 2'    Lumbar PA Grade-  6'    Supine Lumbar Roll     SL Lumbar      Long Axis Hip Distraction Grade       Therapeutic Exercise and NMR EXR  [x] (98542) Provided verbal/tactile cueing for activities related to strengthening, flexibility, endurance, ROM  for improvements in proximal hip and core control with self care, mobility, lifting and ambulation. [x] (88121) Provided verbal/tactile cueing for activities related to improving balance, coordination, kinesthetic sense, posture, motor skill, proprioception  to assist with core control in self care, mobility, lifting, and ambulation.      Therapeutic Activities:    [] (94978 or 38540) Provided verbal/tactile cueing for activities related to improving balance, coordination, kinesthetic sense, posture, motor skill, proprioception and motor activation to allow for proper function  with self care and ADLs  [] (54494) Provided training and instruction to the patient for proper core and proximal hip recruitment and positioning with ambulation re-education     Home Exercise Program:    [x] (29353) Reviewed/Progressed HEP activities related to strengthening, flexibility, endurance, ROM of core, proximal hip and LE for functional self-care, mobility, lifting and ambulation   [] (36987) Reviewed/Progressed HEP activities related to improving balance, coordination, kinesthetic sense, posture, motor skill, proprioception of core, proximal hip and LE for self care, mobility, lifting, and ambulation      Manual Treatments:    [x] (02993) Provided manual therapy to mobilize proximal hip and LS spine soft tissue/joints for the purpose of modulating pain, promoting relaxation,  increasing ROM, reducing/eliminating soft tissue swelling/inflammation/restriction, improving soft tissue extensibility and allowing for proper ROM for normal function with self care, mobility, lifting and ambulation. Modalities:      Charges:  Timed Code Treatment Minutes: 45   Total Treatment Minutes: 55     [] EVAL (LOW) 39138 (typically 20 minutes face-to-face)  [] EVAL (MOD) 04207 (typically 30 minutes face-to-face)  [] EVAL (HIGH) 43902 (typically 45 minutes face-to-face)  [] RE-EVAL     [x] MH(13197) x 1     [] IONTO  [x] NMR (97350) x 1    [] VASO  [x] Manual (10782) x 1     [] Other:  [] TA x      [] Mech Traction (72177)  [] ES(attended) (45912)      [] ES (un) (76326):     Goals:   Short Term Goals: To be achieved in: 2 weeks  1. Independent in HEP and progression per patient tolerance, in order to prevent re-injury. [] Progressing: [] Met: [] Not Met: [] Adjusted  2. Patient will have a decrease in pain to facilitate improvement in movement, function, and ADLs as indicated by Functional Deficits. [] Progressing: [] Met: [] Not Met: [] Adjusted    Long Term Goals: To be achieved in: 4 weeks  1. Disability index score of 10% or less for the [facility] to assist with reaching prior level of function.    [] Progressing: [] Met: [] Not Met: [] Adjusted  2. Patient will demonstrate increased AROM to WNL, good LS mobility, good hip ROM to allow for proper joint functioning as indicated by patients Functional Deficits. [] Progressing: [] Met: [] Not Met: [] Adjusted  3. Patient will demonstrate an increase in Strength to good proximal hip and core activation to allow for proper functional mobility as indicated by patients Functional Deficits. [] Progressing: [] Met: [] Not Met: [] Adjusted  4. Patient will return to sitting or standing for 1 hour, functional activities without increased symptoms or restriction. [] Progressing: [] Met: [] Not Met: [] Adjusted     Overall Progression Towards Functional goals/ Treatment Progress Update:  [] Patient is progressing as expected towards functional goals listed. [] Progression is slowed due to complexities/Impairments listed. [] Progression has been slowed due to co-morbidities. [x] Plan just implemented, too soon to assess goals progression <30days   [] Goals require adjustment due to lack of progress  [] Patient is not progressing as expected and requires additional follow up with physician  [] Other    ASSESSMENT:  Tolerated progression of core strengthening well. Decreased radicular symptoms. Treatment/Activity Tolerance:  [x] Patient tolerated treatment well [] Patient limited by fatique  [] Patient limited by pain  [] Patient limited by other medical complications  [] Other:     Prognosis: [x] Good [] Fair  [] Poor    Patient Requires Follow-up: [x] Yes  [] No    PLAN: See eval  [x] Continue per plan of care [] Alter current plan (see comments)  [] Plan of care initiated [] Hold pending MD visit [] Discharge        Electronically signed by: Tosha Duarte PT, OMT-C        Note: If patient does not return for scheduled/ recommended follow up visits, this note will serve as a discharge from care along with most recent update on progress.

## 2020-09-15 ENCOUNTER — OFFICE VISIT (OUTPATIENT)
Dept: ORTHOPEDIC SURGERY | Age: 50
End: 2020-09-15
Payer: COMMERCIAL

## 2020-09-15 VITALS — WEIGHT: 175.93 LBS | BODY MASS INDEX: 28.27 KG/M2 | TEMPERATURE: 98.4 F | HEIGHT: 66 IN

## 2020-09-15 PROCEDURE — 99213 OFFICE O/P EST LOW 20 MIN: CPT | Performed by: INTERNAL MEDICINE

## 2020-09-15 NOTE — LETTER
[address] [address]  Phone: [phone]  Fax: [phone]    Alcon Benton MD        September 15, 2020     Patient: Chun Riggs   YOB: 1970   Date of Visit: 9/15/2020       To Whom It May Concern: It is my medical opinion that Daniel Poon should refrain from lifting more than 25 pounds from floor to waist and avoid repetitive bending/stooping activity for the next 1 month and gradually liberalize activity in this regard thereafter    Continue lumbar stabilization/Shad exercise program and transition to I HEP  Reactivation strengthening exercises S1 myotome    Clinical reevaluation in 6 to 8 weeks to assess S1 sensorimotor function  S1 weakness 4-/5 on today's examination. If you have any questions or concerns, please don't hesitate to call. Diagnosis Orders   1. Herniation of intervertebral disc between L5 and S1     2. Lumbosacral radiculopathy at S1     3. Left lumbar radiculitis       Sincerely,    .   Maria Pratt MD.    Alcon Benton MD

## 2020-09-15 NOTE — PROGRESS NOTES
Exam:    Inspection: No deformity atrophy appreciable curvature      Palpation: No focal trigger point tenderness      Range of Motion: 100/20 without pain      Strength: 4-/5 weakness S1 left      Special Tests: Negative SLR      Skin: There are no rashes, ulcerations or lesions. Gait: Nonantalgic    Neurolgic -Light touch sensation and manual muscle testing normal L2-S1. No fasiculations. Pattella tendon +2 bilaterally and Achilles tendon reflex hypoactive on the left +2 on the right. Additional Comments:        Additional Examinations:                   Office Imaging Results/Procedures PerformedToday:            Office Procedures:   No orders of the defined types were placed in this encounter. Other Outside Imaging and Testing Personally Reviewed:    No results found. Assessment   Impression: . Encounter Diagnoses   Name Primary?  Herniation of intervertebral disc between L5 and S1 Yes    Lumbosacral radiculopathy at S1     Left lumbar radiculitis               Plan:     Transition to ATC supervised rehabilitation  Clinical follow-up 6 to 8 weeks monitor sensorimotor radiculopathy S1 left  Transition to PRN use of NSAIDs and/or muscle relaxants and discontinue gabapentin as she has already done  Activity modification lumbar disc protocol         Orders:      No orders of the defined types were placed in this encounter. Melanie Carreno MD.      Disclaimer: \"This note was dictated with voice recognition software. Though review and correction are routine, we apologize for any errors. \"

## 2021-03-15 ENCOUNTER — OFFICE VISIT (OUTPATIENT)
Dept: ORTHOPEDIC SURGERY | Age: 51
End: 2021-03-15
Payer: COMMERCIAL

## 2021-03-15 VITALS — BODY MASS INDEX: 28.27 KG/M2 | TEMPERATURE: 97.5 F | WEIGHT: 175.93 LBS | HEIGHT: 66 IN

## 2021-03-15 DIAGNOSIS — M54.17 LUMBOSACRAL RADICULOPATHY AT S1: ICD-10-CM

## 2021-03-15 DIAGNOSIS — M54.16 LEFT LUMBAR RADICULITIS: ICD-10-CM

## 2021-03-15 DIAGNOSIS — M51.27 HERNIATION OF INTERVERTEBRAL DISC BETWEEN L5 AND S1: Primary | ICD-10-CM

## 2021-03-15 PROCEDURE — 99214 OFFICE O/P EST MOD 30 MIN: CPT | Performed by: INTERNAL MEDICINE

## 2021-03-15 RX ORDER — MELOXICAM 15 MG/1
15 TABLET ORAL DAILY PRN
Qty: 90 TABLET | Refills: 0 | Status: SHIPPED | OUTPATIENT
Start: 2021-03-15

## 2021-03-15 RX ORDER — GABAPENTIN 300 MG/1
300 CAPSULE ORAL NIGHTLY
Qty: 30 CAPSULE | Refills: 0 | Status: SHIPPED | OUTPATIENT
Start: 2021-03-15 | End: 2021-04-14

## 2021-03-15 RX ORDER — METHYLPREDNISOLONE 4 MG/1
TABLET ORAL
Qty: 1 KIT | Refills: 0 | Status: SHIPPED | OUTPATIENT
Start: 2021-03-15

## 2021-03-15 NOTE — PROGRESS NOTES
Chief Complaint:   Chief Complaint   Patient presents with    Leg Pain     left leg numbness returned gradually, L foot is always numb          History of Present Illness:       Patient is a 48 y.o. female returns follow up for the above complaint. The patient was last seen approximately 6 monthsago. The symptoms are worsening since the last visit. The patient has had no further testing for the problem. Symptoms have been worsening over the past 6 weeks. Symptoms currently involve the same left lower extremity      Essentially lost to follow-up most recent visit 9/15/2020 at which time she was to progress with physical therapy and    Past medical history significant for leftward disc extrusion L5-S1 resulting in sensorimotor radiculopathy at the left S1 level. Previous work-up is included MRI evaluation which is outlined below in detail    Admittedly, she discontinued performing her maintenance therapy regimen and in general symptoms have resurfaced over the past 6 weeks correlating with increased activity levels as . The symptoms of back pain do not show a typical discogenic provacative pattern at this time and are worsened by Increased activity levels and improved with recumbency. There is not new onset weakness or progressive weakness of the lower extremities that has developed. The patient denies new onset bowel or bladder dysfunction. There  is no history of recent previous spinal trauma. There is no associated back pain at this time    Painl levels: 4    The back pain : limb pain is 100 : 0 and follows a L5-S1 dermatomal distribution involving Left lower extremity. The patient has no history or autoimmune disease, inflammatory arthropathy or crystal arthropathy. Past Medical History:      No past medical history on file.      Present Medications:         Current Outpatient Medications   Medication Sig Dispense Refill    methylPREDNISolone (MEDROL, JASBIR,) 4 MG tablet By mouth. 1 kit 0    gabapentin (NEURONTIN) 300 MG capsule Take 1 capsule by mouth nightly for 30 days. Intended supply: 30 days 30 capsule 0    meloxicam (MOBIC) 15 MG tablet Take 1 tablet by mouth daily as needed for Pain Start after completing prednisone 90 tablet 0    cyclobenzaprine (FLEXERIL) 10 MG tablet Take 1 tablet by mouth nightly as needed for Muscle spasms 30 tablet 1    Drospirenone-Ethinyl Estradiol (VESTURA PO) Take by mouth       No current facility-administered medications for this visit. Allergies:      No Known Allergies        Review of Systems:    Pertinent items are noted in HPI   10 point review of systems negative except as mentioned in HPI       Vital Signs:      Vitals:    03/15/21 1031   Temp: 97.5 °F (36.4 °C)        General Exam:     Constitutional: Patient is adequately groomed with no evidence of malnutrition  Mental Status: The patient is oriented to time, place and person. The patient's mood and affect are appropriate. Vascular: Examination reveals no swelling or calf tenderness. Peripheral pulses are palpable and 2+. Lymphatics: no lymphadenopathy of the inguinal region or lower extremity      Physical Exam: Lumbar spine examination      Primary Exam:    Inspection: No deformity atrophy appreciable curvature      Palpation: No focal trigger point tenderness 110/20 without pain      Range of Motion:          Strength: Normal lower extremity      Special Tests: Negative SLR      Skin: There are no rashes, ulcerations or lesions. Gait: Nonantalgic      Reflex asymmetric decreased S1 reflex +1 on the left +2 on the right     Additional Comments:        Additional Examinations:           Right Lower Extremity: Examination of the right lower extremity does not show any tenderness, deformity or injury. Range of motion is unremarkable. There is no gross instability. There are no rashes, ulcerations or lesions.   Strength and tone are normal.  Left Lower Extremity: Examination of the left lower extremity does not show any tenderness, deformity or injury. Range of motion is unremarkable. There is no gross instability. There are no rashes, ulcerations or lesions. Strength and tone are normal.   Neurolgic -Light touch sensation and manual muscle testing normal L2-S1. No fasiculations. Pattella tendon and Achilles tendon reflexes +2 bilaterally. Seated SLR negative        Office Imaging Results/Procedures PerformedToday:             Office Procedures:     Orders Placed This Encounter   Procedures    Ambulatory referral to Physical Therapy     Referral Priority:   Routine     Referral Type:   Eval and Treat     Referral Reason:   Specialty Services Required     Requested Specialty:   Physical Therapy     Number of Visits Requested:   1           Other Outside Imaging and Testing Personally Reviewed:    Norm Bansal #: [id number] #: [id number] Procedure: MR Lumbar Spine w/o Contrast ; Reason for Exam: Lumbar discogenic pain syndrome; lumbosacral radiculopathy at S1; lumbar pain   This document is confidential medical information.  Unauthorized disclosure or use of this information is prohibited by law. If you are not the intended recipient of this document, please advise us by calling immediately [phone].       [employer] [address] [place]           Patient Name: Arabella Larkin   Case ID: [id number]   Patient : 1970   Referring Physician: Evita Guzman MD   Exam Date: 2020   Exam Description: MR Lumbar Spine w/o Contrast            HISTORY:  Low back pain into left lower extremity.       TECHNICAL FACTORS:  Long- and short-axis fat- and water-weighted images were performed.       COMPARISON:  None.       FINDINGS:  No scoliosis.       No lumbar fracture.       Minor disc desiccation with mild-to-moderate sterile discogenic endplate changes at N4-K3.       The conus is normal terminating at L1.       Findings at individual levels demonstrate:       T11-12, T12-L1, L1-2 unremarkable.       L2-3 minor facet hypertrophy.  No compressive abnormality.       L3-4 mild diffuse spondylotic disc displacement with right lateral annular tear and shallow    disc protrusion barely abutting exiting right L3 nerve root.       L4-5 mild diffuse spondylotic disc displacement and nominal facet hypertrophy.  Broad-based    right lateral annular tear with very shallow disc protrusion.  Gentle abutment of exiting right    L4 nerve root.       L5-S1 focal caudally migrating left recess disc herniation, extrusion type with approximately 5    mm free fragment below the disc space in the recess compressing the descending left S1 nerve    root.  Minor abutment of left more than right exiting L5 nerve roots.       No hydronephrosis.  No aortic aneurysm or retroperitoneal adenopathy.       CONCLUSION:   L5-S1 focal caudally migrating left recess disc herniation, extrusion type with approximately 5    mm free fragment below the disc space in the recess compressing the descending left S1 nerve    root. Minor abutment of left more than right exiting L5 nerve roots.       Thank you for the opportunity to provide your interpretation.               Aziza aCrlos MD       A: [facility] 08/24/2020 11:17 AM                       Assessment   Impression: . Encounter Diagnoses   Name Primary?  Herniation of intervertebral disc between L5 and S1 Yes    Lumbosacral radiculopathy at S1     Left lumbar radiculitis         L5/S1 left disc extrusion MRI 8/24/2020      Plan:        Activity modification lumbar disc protocol  Medical pain management Medrol Dosepak and gabapentin followed by meloxicam thereafter with GI precaution  Formal course of PT no sooner than 1 week from today's visit and resume I HEP-extension based in the interim  If symptoms are refractory to aggressive conservative management recommend repeat MRI and consider a candidate for L LOTUS left       Orders: Orders Placed This Encounter   Procedures    Ambulatory referral to Physical Therapy     Referral Priority:   Routine     Referral Type:   Eval and Treat     Referral Reason:   Specialty Services Required     Requested Specialty:   Physical Therapy     Number of Visits Requested:   1         Rebecca Burks MD.      Jinny Larson: \"This note was dictated with voice recognition software. Though review and correction are routine, we apologize for any errors. \"

## 2023-09-15 ENCOUNTER — OFFICE VISIT (OUTPATIENT)
Dept: ORTHOPEDIC SURGERY | Age: 53
End: 2023-09-15

## 2023-09-15 VITALS — HEIGHT: 66 IN | BODY MASS INDEX: 28.12 KG/M2 | WEIGHT: 175 LBS

## 2023-09-15 DIAGNOSIS — M54.50 LUMBAR PAIN: ICD-10-CM

## 2023-09-15 DIAGNOSIS — M51.27 HERNIATED NUCLEUS PULPOSUS, L5-S1: Primary | ICD-10-CM

## 2023-09-15 RX ORDER — GABAPENTIN 300 MG/1
300 CAPSULE ORAL 3 TIMES DAILY
Qty: 90 CAPSULE | Refills: 0 | Status: SHIPPED | OUTPATIENT
Start: 2023-09-15 | End: 2023-10-15

## 2023-09-15 RX ORDER — PREDNISONE 20 MG/1
TABLET ORAL
Qty: 20 TABLET | Refills: 0 | Status: SHIPPED | OUTPATIENT
Start: 2023-09-15

## 2023-09-15 NOTE — PROGRESS NOTES
New Patient: LUMBAR SPINE    Referring Provider:  No ref. provider found    CHIEF COMPLAINT:    Chief Complaint   Patient presents with    Back Problem     LUMBAR PAIN       HISTORY OF PRESENT ILLNESS:       Ms. Broderick Yi  is a pleasant 48 y.o. female here for evaluation regarding her LBP and left leg pain. She states her pain began insidiously in 2016 and then got worse in 2020. Patient has been seen previously by Dr. Isabelle Servin who did order a MRI of the lumbar spine and she did participate in outpatient physical therapy at that time. She states that over the past few months her pain has increased significantly over the last 3 weeks. She states the pain at the present time is 3/10 within her left low back with 7/10 pain within the left buttock and 7/10 pain within the left posterior thigh. She states the pain is constant and can last most of the day and does interfere with her sleep. She finds standing walking is limited to 10 minutes secondary to pain. She has been experiencing numbness and tingling over the left lateral and left posterior lateral leg to her foot. She does have a sense of weakness on the left as compared to right. The pain is worse with any prolonged standing and lying down improved with sitting. She denies any bowel or bladder dysfunction or saddle anesthesia. Pain Assessment  Location of Pain: Back  Severity of Pain: 7  Quality of Pain: Dull, Aching, Sharp, Other (Comment) (BURNING)  Duration of Pain: Persistent  Frequency of Pain: Constant]  Current/Past Treatment:   Physical Therapy: In the past with minimal benefit  Chiropractic: None  Injection: None  Medications: Mobic and Flexeril    Past Medical History:   No past medical history on file.      Past Surgical History:     Past Surgical History:   Procedure Laterality Date    HAND SURGERY Right     ring finger, plate inserted    KNEE SURGERY Right     ACL        Current Medications:     Current Outpatient Medications:

## 2023-09-25 ENCOUNTER — TELEPHONE (OUTPATIENT)
Dept: ORTHOPEDIC SURGERY | Age: 53
End: 2023-09-25

## 2023-09-25 NOTE — TELEPHONE ENCOUNTER
Appointment Request     Patient requesting earlier appointment: Yes  Appointment offered to patient: YES  Patient Contact Number: [phone]    PATIENT CALLED TO SCHEDULE MRI F/U WITH VITO Krishnamurthy [facility]. FIRST AVAILABLE APPT NOT UNTIL 10/12. PATIENT IS WANTING TO KNOW IF SHE CAN GET MRI RESULTS OVER THE PHONE. PLEASE CALL PT BACK AT THE ABOVE NUMBER.

## 2023-10-03 ENCOUNTER — OFFICE VISIT (OUTPATIENT)
Dept: ORTHOPEDIC SURGERY | Age: 53
End: 2023-10-03
Payer: COMMERCIAL

## 2023-10-03 VITALS — HEIGHT: 66 IN | BODY MASS INDEX: 28.12 KG/M2 | WEIGHT: 175 LBS

## 2023-10-03 DIAGNOSIS — M51.27 HERNIATED NUCLEUS PULPOSUS, L5-S1: Primary | ICD-10-CM

## 2023-10-03 PROCEDURE — 99213 OFFICE O/P EST LOW 20 MIN: CPT | Performed by: ORTHOPAEDIC SURGERY

## 2023-10-03 RX ORDER — GABAPENTIN 300 MG/1
CAPSULE ORAL
COMMUNITY
Start: 2020-08-25

## 2023-10-03 NOTE — PROGRESS NOTES
New Patient: LUMBAR SPINE    Referring Provider:  No ref. provider found    CHIEF COMPLAINT:    Chief Complaint   Patient presents with    Follow-up     Follow up mri results , patient of Dinesh Hernandes       HISTORY OF PRESENT ILLNESS:       Ms. Jean Carlos Duran  is a pleasant 48 y.o. female referred by Dinesh Hernandes [facility] for evaluation regarding her LBP and left leg pain. She states her pain began insidiously in 2016 and then got worse in 2020. She states that over the past few months her pain has increased significantly over the last 2 months. She states the pain at the present time is 3/10 within her left low back with 7/10 pain within the left buttock and 7/10 pain within the left posterior thigh. She states the pain is constant and can last most of the day and does interfere with her sleep. She finds standing walking is limited to 10 minutes secondary to pain. She has been experiencing numbness and tingling over the left lateral and left posterior lateral leg to her foot. She does have a sense of weakness on the left as compared to right. The pain is worse with any prolonged standing and lying down improved with sitting. She denies any bowel or bladder dysfunction or saddle anesthesia. Pain Assessment  Location of Pain: Back  Location Modifiers: Left  Severity of Pain: 7  Quality of Pain: Throbbing, Sharp, Dull  Duration of Pain: Persistent  Frequency of Pain: Constant  Aggravating Factors: Bending, Stretching, Straightening, Walking, Standing  Limiting Behavior: Yes  Relieving Factors:  (Gabapentin)  Result of Injury: No  Work-Related Injury: No  Are there other pain locations you wish to document?: No]  Current/Past Treatment:   Physical Therapy: In the past with minimal benefit  Chiropractic: None  Injection: None  Medications: Oral steroids, Neurontin Mobic and Flexeril    Past Medical History:   History reviewed. No pertinent past medical history.      Past Surgical History:     Past

## 2023-10-13 RX ORDER — GABAPENTIN 300 MG/1
300 CAPSULE ORAL 3 TIMES DAILY
Qty: 90 CAPSULE | Refills: 0 | Status: SHIPPED | OUTPATIENT
Start: 2023-10-13 | End: 2023-11-12

## 2024-01-02 RX ORDER — GABAPENTIN 300 MG/1
300 CAPSULE ORAL 3 TIMES DAILY
Qty: 90 CAPSULE | Refills: 0 | Status: SHIPPED | OUTPATIENT
Start: 2024-01-02 | End: 2024-02-01

## 2024-02-29 ENCOUNTER — TELEPHONE (OUTPATIENT)
Dept: ORTHOPEDIC SURGERY | Age: 54
End: 2024-02-29

## 2024-02-29 RX ORDER — GABAPENTIN 300 MG/1
300 CAPSULE ORAL 3 TIMES DAILY
Qty: 90 CAPSULE | Refills: 0 | OUTPATIENT
Start: 2024-02-29

## 2024-02-29 NOTE — TELEPHONE ENCOUNTER
Prescription Refill     Medication Name:  GABAPENTIN  Pharmacy: [id number] [address] - P [phone] - F [phone]   Patient Contact Number:  [phone]

## 2024-03-27 ENCOUNTER — OFFICE VISIT (OUTPATIENT)
Dept: ORTHOPEDIC SURGERY | Age: 54
End: 2024-03-27
Payer: COMMERCIAL

## 2024-03-27 VITALS — BODY MASS INDEX: 28.13 KG/M2 | HEIGHT: 66 IN | WEIGHT: 175.04 LBS

## 2024-03-27 DIAGNOSIS — M51.16 LUMBAR DISC HERNIATION WITH RADICULOPATHY: Primary | ICD-10-CM

## 2024-03-27 PROCEDURE — 3017F COLORECTAL CA SCREEN DOC REV: CPT | Performed by: ORTHOPAEDIC SURGERY

## 2024-03-27 PROCEDURE — G8484 FLU IMMUNIZE NO ADMIN: HCPCS | Performed by: ORTHOPAEDIC SURGERY

## 2024-03-27 PROCEDURE — G8427 DOCREV CUR MEDS BY ELIG CLIN: HCPCS | Performed by: ORTHOPAEDIC SURGERY

## 2024-03-27 PROCEDURE — 99214 OFFICE O/P EST MOD 30 MIN: CPT | Performed by: ORTHOPAEDIC SURGERY

## 2024-03-27 PROCEDURE — G8419 CALC BMI OUT NRM PARAM NOF/U: HCPCS | Performed by: ORTHOPAEDIC SURGERY

## 2024-03-27 PROCEDURE — 1036F TOBACCO NON-USER: CPT | Performed by: ORTHOPAEDIC SURGERY

## 2024-03-27 RX ORDER — GABAPENTIN 300 MG/1
300 CAPSULE ORAL 4 TIMES DAILY
Qty: 120 CAPSULE | Refills: 1 | Status: SHIPPED | OUTPATIENT
Start: 2024-03-27 | End: 2024-05-26

## 2024-03-27 RX ORDER — CYCLOBENZAPRINE HCL 10 MG
10 TABLET ORAL 3 TIMES DAILY PRN
Qty: 60 TABLET | Refills: 0 | Status: SHIPPED | OUTPATIENT
Start: 2024-03-27

## 2024-03-27 NOTE — PROGRESS NOTES
New Patient: LUMBAR SPINE    Referring Provider:  No ref. provider found    CHIEF COMPLAINT:    Chief Complaint   Patient presents with    Follow-up     EMG Review       HISTORY OF PRESENT ILLNESS:       Ms. Arleth Campos returns today with persistent LBP and left leg pain. She states her pain began insidiously in 2016 and then got worse in 2020.   She states that over the past few months her pain has increased significantly over the last 5 months.  She states the pain at the present time is 3/10 within her left low back with 7/10 pain within the left buttock and 7/10 pain within the left posterior thigh.  She states the pain is constant and can last most of the day and does interfere with her sleep.  She finds standing walking is limited to 10 minutes secondary to pain.  She has been experiencing numbness and tingling over the left lateral and left posterior lateral leg to her foot.  She does have a sense of weakness on the left as compared to right.  The pain is worse with any prolonged standing and lying down improved with sitting.  She denies any bowel or bladder dysfunction or saddle anesthesia.    Pain Assessment  Location of Pain: Back  Location Modifiers: Right, Left  Severity of Pain: 6  Quality of Pain: Other (Comment)  Duration of Pain: Persistent  Frequency of Pain: Constant  Limiting Behavior: Yes  Result of Injury: No  Work-Related Injury: No  Are there other pain locations you wish to document?: No]  Current/Past Treatment:   Physical Therapy: In the past with minimal benefit  Chiropractic: None  Injection: None  Medications: Oral steroids, Neurontin Mobic and Flexeril    Past Medical History:   No past medical history on file.     Past Surgical History:     Past Surgical History:   Procedure Laterality Date    HAND SURGERY Right     ring finger, plate inserted    KNEE SURGERY Right     ACL        Current Medications:     Current Outpatient Medications:     gabapentin (NEURONTIN) 300 MG capsule,

## 2024-04-15 ENCOUNTER — TELEPHONE (OUTPATIENT)
Dept: ADMINISTRATIVE | Age: 54
End: 2024-04-15

## 2024-04-15 NOTE — TELEPHONE ENCOUNTER
APPROVAL     DX CODE: M51.1   CPT CODE: 47666   AREA OF SURGERY: LUMBAR  DATE RANGE: 04/24/2024  to 10/21/2024   LOCATION: [facility]  INSURANCE: [facility] [employer]  AUTH #: [id number]      Approval scanned in Media

## 2024-04-19 NOTE — H&P
HISTORY OF PRESENT ILLNESS:        Ms. Arleth Campos returns today with persistent LBP and left leg pain. She states her pain began insidiously in 2016 and then got worse in 2020.   She states that over the past few months her pain has increased significantly over the last 5 months.  She states the pain at the present time is 3/10 within her left low back with 7/10 pain within the left buttock and 7/10 pain within the left posterior thigh.  She states the pain is constant and can last most of the day and does interfere with her sleep.  She finds standing walking is limited to 10 minutes secondary to pain.  She has been experiencing numbness and tingling over the left lateral and left posterior lateral leg to her foot.  She does have a sense of weakness on the left as compared to right.  The pain is worse with any prolonged standing and lying down improved with sitting.  She denies any bowel or bladder dysfunction or saddle anesthesia.    Pain Assessment  Location of Pain: Back  Location Modifiers: Right, Left  Severity of Pain: 6  Quality of Pain: Other (Comment)  Duration of Pain: Persistent  Frequency of Pain: Constant  Limiting Behavior: Yes  Result of Injury: No  Work-Related Injury: No  Are there other pain locations you wish to document?: No]  Current/Past Treatment:   Physical Therapy: In the past with minimal benefit  Chiropractic: None  Injection: None  Medications: Oral steroids, Neurontin Mobic and Flexeril     Past Medical History:   Past Medical History   No past medical history on file.         Past Surgical History:     Past Surgical History         Past Surgical History:   Procedure Laterality Date    HAND SURGERY Right       ring finger, plate inserted    KNEE SURGERY Right       ACL             Current Medications:     Current Medication      Current Outpatient Medications:     gabapentin (NEURONTIN) 300 MG capsule, Take 1 capsule by mouth 3 times daily for 30 days., Disp: 90 capsule, Rfl: 0

## 2024-04-24 ENCOUNTER — HOSPITAL ENCOUNTER (OUTPATIENT)
Age: 54
Setting detail: OUTPATIENT SURGERY
Discharge: HOME | End: 2024-04-24
Attending: ORTHOPAEDIC SURGERY | Admitting: ORTHOPAEDIC SURGERY
Payer: COMMERCIAL

## 2024-04-24 ENCOUNTER — PREP FOR PROCEDURE (OUTPATIENT)
Dept: ORTHOPEDIC SURGERY | Age: 54
End: 2024-04-24

## 2024-04-24 VITALS
OXYGEN SATURATION: 98 % | TEMPERATURE: 97.8 F | RESPIRATION RATE: 20 BRPM | DIASTOLIC BLOOD PRESSURE: 100 MMHG | SYSTOLIC BLOOD PRESSURE: 145 MMHG | HEART RATE: 82 BPM

## 2024-04-24 PROCEDURE — 6360000002 HC RX W HCPCS: Performed by: ORTHOPAEDIC SURGERY

## 2024-04-24 PROCEDURE — 7100000010 HC PHASE II RECOVERY - FIRST 15 MIN: Performed by: ORTHOPAEDIC SURGERY

## 2024-04-24 PROCEDURE — 3600000002 HC SURGERY LEVEL 2 BASE: Performed by: ORTHOPAEDIC SURGERY

## 2024-04-24 PROCEDURE — 6360000004 HC RX CONTRAST MEDICATION: Performed by: ORTHOPAEDIC SURGERY

## 2024-04-24 PROCEDURE — 2500000003 HC RX 250 WO HCPCS: Performed by: ORTHOPAEDIC SURGERY

## 2024-04-24 RX ORDER — LIDOCAINE HYDROCHLORIDE 10 MG/ML
INJECTION, SOLUTION EPIDURAL; INFILTRATION; INTRACAUDAL; PERINEURAL PRN
Status: DISCONTINUED | OUTPATIENT
Start: 2024-04-24 | End: 2024-04-24 | Stop reason: ALTCHOICE

## 2024-04-24 RX ORDER — DEXAMETHASONE SODIUM PHOSPHATE 10 MG/ML
INJECTION, SOLUTION INTRAMUSCULAR; INTRAVENOUS
Status: DISCONTINUED
Start: 2024-04-24 | End: 2024-04-24 | Stop reason: HOSPADM

## 2024-04-24 ASSESSMENT — PAIN - FUNCTIONAL ASSESSMENT
PAIN_FUNCTIONAL_ASSESSMENT: NONE - DENIES PAIN
PAIN_FUNCTIONAL_ASSESSMENT: NONE - DENIES PAIN

## 2024-04-24 NOTE — OP NOTE
Operative Note      Patient: Arleth Campos  YOB: 1970  MRN: [id number]    Date of Procedure: 4/24/2024    Pre-Op Diagnosis Codes:     * Lumbar disc herniation with radiculopathy [M51.16]    Post-Op Diagnosis: Same       Procedure(s):  LEFT LUMBAR FIVE SACRAL ONE EPIDURAL STEROID INJECTION SITE CONFIRMED BY FLUOROSCOPY    Surgeon(s):  Brennan Jackson MD    Assistant:   * No surgical staff found *    Anesthesia: None    Estimated Blood Loss (mL): Minimal    Complications: None    Specimens:   * No specimens in log *    Implants:  * No implants in log *      Drains: * No LDAs found *    Findings:  Infection Present At Time Of Surgery (PATOS) (choose all levels that have infection present):  No infection present  Other Findings: na    Detailed Description of Procedure:     The patient was admitted through pre-op and written consent was obtained.  The patient was advised of the risks and benefits of the procedure, including but not limited to the following: bleeding, pain, infection, temporary paralysis, nerve damage and spinal headache.  The patient was given the opportunity to ask questions.  There were no contraindications for this procedure.    The appropriate area was prepped and draped in a sterile fashion.  Landmarks were identified and marked.  The skin and soft tissues were anesthetized with 1% lidocaine.  A 20G Touhy needle was advanced to the left L5/S1 interlaminar space using fluoroscopic guidance with ideal needle tip placement confirmed by multiple views.  Injection of contrast showed epidural flow.  There were no signs of intravascular or intrathecal injection.    80 mg depomedrol and 1cc 1% lidocaine were then injected.     There were no complications and the patient tolerated the procedure well.  The patient was transferred to the recovery area and monitored.  Discharge instructions were given.  The patient is to contact me for any post-procedure concerns.  The patient is to follow up

## 2024-04-24 NOTE — H&P
I have reviewed the history and physical and examined the patient and find no relevant changes.    I have reviewed with the patient and/or family the risks, benefits, and alternatives to the procedure.    MATHEW ALVAREZ MD  4/24/2024 No intake/output data recorded.

## 2024-04-24 NOTE — DISCHARGE INSTRUCTIONS
[facility]    [phone]    Post Pain Management Injection    PATIENT INSTRUCTIONS:     -Resume Normal Diet  -Other    ACTIVITY:    -No driving or operating machinery for 8 hours post procedure without sedation and 24 hours with sedation.  If you are seen driving during this time the proper authorities will be notified.  -Do not stay alone for 4-6 hours after the procedure.  -If you have had IV sedation, do not sign legal documents, make any major decisions, or be involved in work decisions for the remainder of the day.  -May shower or bathe.  -Resume normal activity when full movement/sensation has returned in extremities.           3)  SITE CARE:    -Observe puncture site for signs of infection (redness, warmth swelling, drainage with a foul odor, fever or increased tenderness).           4)  EXPECTED SIDE EFFECTS:    -Numbness/tingling/weakness in extremities, if this lasts more than 6 hours notify Dr. Jackson  -Muscle stiffness, soreness at puncture site (soreness may last 2-4 days).    DIABETIC PATIENTS ONLY:    -Increased glucose levels in all diabetic patients who have received a steroid injection.  -Monitor blood sugars frequently for the first 5 days following procedure.      -Adjust medication accordingly.           6)  TO REACH DR. Jackson: Call [phone]    ADDITIONAL INSTRUCTIONS:    Follow-up as scheduled or call for appointment if not already done.  Patients taking Coumadin may resume taking as before the procedure.